# Patient Record
Sex: MALE | Race: WHITE | Employment: OTHER | URBAN - METROPOLITAN AREA
[De-identification: names, ages, dates, MRNs, and addresses within clinical notes are randomized per-mention and may not be internally consistent; named-entity substitution may affect disease eponyms.]

---

## 2021-08-26 ENCOUNTER — TELEPHONE (OUTPATIENT)
Facility: CLINIC | Age: 71
End: 2021-08-26

## 2021-08-26 ENCOUNTER — HOSPITAL ENCOUNTER (OUTPATIENT)
Facility: CLINIC | Age: 71
Setting detail: OBSERVATION
Discharge: HOME | End: 2021-08-29
Attending: HOSPITALIST | Admitting: HOSPITALIST
Payer: MEDICARE

## 2021-08-26 DIAGNOSIS — L03.115 CELLULITIS OF RIGHT LEG: Primary | ICD-10-CM

## 2021-08-26 PROBLEM — L03.90 CELLULITIS: Status: ACTIVE | Noted: 2021-08-26

## 2021-08-26 LAB
GLUCOSE BLDC GLUCOMTR-MCNC: 142 MG/DL (ref 70–99)
HBA1C MFR BLD: 6.7 % (ref 0–5.6)

## 2021-08-26 PROCEDURE — 250N000012 HC RX MED GY IP 250 OP 636 PS 637: Performed by: HOSPITALIST

## 2021-08-26 PROCEDURE — 250N000013 HC RX MED GY IP 250 OP 250 PS 637: Performed by: HOSPITALIST

## 2021-08-26 PROCEDURE — 99220 PR INITIAL OBSERVATION CARE,LEVEL III: CPT | Performed by: HOSPITALIST

## 2021-08-26 PROCEDURE — 96374 THER/PROPH/DIAG INJ IV PUSH: CPT

## 2021-08-26 PROCEDURE — G0378 HOSPITAL OBSERVATION PER HR: HCPCS

## 2021-08-26 PROCEDURE — 36415 COLL VENOUS BLD VENIPUNCTURE: CPT | Performed by: HOSPITALIST

## 2021-08-26 PROCEDURE — 83036 HEMOGLOBIN GLYCOSYLATED A1C: CPT | Performed by: HOSPITALIST

## 2021-08-26 PROCEDURE — 96372 THER/PROPH/DIAG INJ SC/IM: CPT | Mod: XU | Performed by: HOSPITALIST

## 2021-08-26 PROCEDURE — 250N000011 HC RX IP 250 OP 636: Performed by: HOSPITALIST

## 2021-08-26 RX ORDER — HYDRALAZINE HYDROCHLORIDE 25 MG/1
25 TABLET, FILM COATED ORAL 3 TIMES DAILY
COMMUNITY

## 2021-08-26 RX ORDER — NICOTINE POLACRILEX 4 MG
15-30 LOZENGE BUCCAL
Status: DISCONTINUED | OUTPATIENT
Start: 2021-08-26 | End: 2021-08-29 | Stop reason: HOSPADM

## 2021-08-26 RX ORDER — LOSARTAN POTASSIUM 100 MG/1
100 TABLET ORAL DAILY
COMMUNITY

## 2021-08-26 RX ORDER — CYCLOBENZAPRINE HCL 10 MG
10 TABLET ORAL 3 TIMES DAILY PRN
COMMUNITY

## 2021-08-26 RX ORDER — ALBUTEROL SULFATE 90 UG/1
2 AEROSOL, METERED RESPIRATORY (INHALATION) EVERY 4 HOURS PRN
Status: DISCONTINUED | OUTPATIENT
Start: 2021-08-26 | End: 2021-08-29 | Stop reason: HOSPADM

## 2021-08-26 RX ORDER — ACETAMINOPHEN 650 MG/1
650 SUPPOSITORY RECTAL EVERY 6 HOURS PRN
Status: DISCONTINUED | OUTPATIENT
Start: 2021-08-26 | End: 2021-08-29 | Stop reason: HOSPADM

## 2021-08-26 RX ORDER — GABAPENTIN 300 MG/1
900 CAPSULE ORAL 3 TIMES DAILY
COMMUNITY

## 2021-08-26 RX ORDER — HYDRALAZINE HYDROCHLORIDE 20 MG/ML
10 INJECTION INTRAMUSCULAR; INTRAVENOUS EVERY 4 HOURS PRN
Status: DISCONTINUED | OUTPATIENT
Start: 2021-08-26 | End: 2021-08-29 | Stop reason: HOSPADM

## 2021-08-26 RX ORDER — INSULIN GLARGINE 100 [IU]/ML
28 INJECTION, SOLUTION SUBCUTANEOUS AT BEDTIME
COMMUNITY

## 2021-08-26 RX ORDER — FUROSEMIDE 40 MG
40 TABLET ORAL DAILY
COMMUNITY

## 2021-08-26 RX ORDER — GABAPENTIN 300 MG/1
900 CAPSULE ORAL 3 TIMES DAILY
Status: DISCONTINUED | OUTPATIENT
Start: 2021-08-26 | End: 2021-08-29 | Stop reason: HOSPADM

## 2021-08-26 RX ORDER — AZELASTINE 1 MG/ML
2 SPRAY, METERED NASAL 2 TIMES DAILY PRN
COMMUNITY

## 2021-08-26 RX ORDER — BUDESONIDE AND FORMOTEROL FUMARATE DIHYDRATE 160; 4.5 UG/1; UG/1
2 AEROSOL RESPIRATORY (INHALATION) 2 TIMES DAILY
COMMUNITY

## 2021-08-26 RX ORDER — FUROSEMIDE 40 MG
40 TABLET ORAL DAILY
Status: DISCONTINUED | OUTPATIENT
Start: 2021-08-27 | End: 2021-08-29 | Stop reason: HOSPADM

## 2021-08-26 RX ORDER — DEXTROSE MONOHYDRATE 25 G/50ML
25-50 INJECTION, SOLUTION INTRAVENOUS
Status: DISCONTINUED | OUTPATIENT
Start: 2021-08-26 | End: 2021-08-29 | Stop reason: HOSPADM

## 2021-08-26 RX ORDER — SPIRONOLACTONE 25 MG/1
25 TABLET ORAL DAILY
COMMUNITY

## 2021-08-26 RX ORDER — HYDRALAZINE HYDROCHLORIDE 25 MG/1
25 TABLET, FILM COATED ORAL 3 TIMES DAILY
Status: DISCONTINUED | OUTPATIENT
Start: 2021-08-26 | End: 2021-08-29 | Stop reason: HOSPADM

## 2021-08-26 RX ORDER — IPRATROPIUM BROMIDE 42 UG/1
2 SPRAY, METERED NASAL 2 TIMES DAILY
COMMUNITY

## 2021-08-26 RX ORDER — FAMOTIDINE 20 MG/1
20 TABLET, FILM COATED ORAL 2 TIMES DAILY
COMMUNITY

## 2021-08-26 RX ORDER — ACETAMINOPHEN 325 MG/1
650 TABLET ORAL EVERY 6 HOURS PRN
Status: DISCONTINUED | OUTPATIENT
Start: 2021-08-26 | End: 2021-08-29 | Stop reason: HOSPADM

## 2021-08-26 RX ORDER — INSULIN GLARGINE 100 [IU]/ML
26 INJECTION, SOLUTION SUBCUTANEOUS EVERY MORNING
COMMUNITY

## 2021-08-26 RX ORDER — AMLODIPINE BESYLATE 5 MG/1
5 TABLET ORAL DAILY
Status: DISCONTINUED | OUTPATIENT
Start: 2021-08-27 | End: 2021-08-29 | Stop reason: HOSPADM

## 2021-08-26 RX ORDER — METOPROLOL SUCCINATE 50 MG/1
50 TABLET, EXTENDED RELEASE ORAL DAILY
COMMUNITY

## 2021-08-26 RX ORDER — INSULIN GLARGINE 100 [IU]/ML
26 INJECTION, SOLUTION SUBCUTANEOUS 2 TIMES DAILY
Status: DISCONTINUED | OUTPATIENT
Start: 2021-08-26 | End: 2021-08-26

## 2021-08-26 RX ORDER — ONDANSETRON 4 MG/1
4 TABLET, ORALLY DISINTEGRATING ORAL EVERY 6 HOURS PRN
Status: DISCONTINUED | OUTPATIENT
Start: 2021-08-26 | End: 2021-08-29 | Stop reason: HOSPADM

## 2021-08-26 RX ORDER — METFORMIN HCL 500 MG
1000 TABLET, EXTENDED RELEASE 24 HR ORAL EVERY MORNING
COMMUNITY

## 2021-08-26 RX ORDER — LOSARTAN POTASSIUM 100 MG/1
100 TABLET ORAL DAILY
Status: DISCONTINUED | OUTPATIENT
Start: 2021-08-27 | End: 2021-08-29 | Stop reason: HOSPADM

## 2021-08-26 RX ORDER — ATENOLOL 50 MG/1
50 TABLET ORAL DAILY
Status: ON HOLD | COMMUNITY
End: 2021-08-28

## 2021-08-26 RX ORDER — ONDANSETRON 2 MG/ML
4 INJECTION INTRAMUSCULAR; INTRAVENOUS EVERY 6 HOURS PRN
Status: DISCONTINUED | OUTPATIENT
Start: 2021-08-26 | End: 2021-08-29 | Stop reason: HOSPADM

## 2021-08-26 RX ORDER — ALBUTEROL SULFATE 90 UG/1
2 AEROSOL, METERED RESPIRATORY (INHALATION) EVERY 4 HOURS PRN
COMMUNITY

## 2021-08-26 RX ORDER — AMLODIPINE BESYLATE 5 MG/1
5 TABLET ORAL DAILY
COMMUNITY

## 2021-08-26 RX ORDER — METOPROLOL SUCCINATE 50 MG/1
50 TABLET, EXTENDED RELEASE ORAL DAILY
Status: DISCONTINUED | OUTPATIENT
Start: 2021-08-27 | End: 2021-08-29 | Stop reason: HOSPADM

## 2021-08-26 RX ORDER — FLUTICASONE PROPIONATE 50 MCG
1 SPRAY, SUSPENSION (ML) NASAL DAILY
COMMUNITY

## 2021-08-26 RX ORDER — FAMOTIDINE 20 MG/1
20 TABLET, FILM COATED ORAL 2 TIMES DAILY
Status: DISCONTINUED | OUTPATIENT
Start: 2021-08-27 | End: 2021-08-29 | Stop reason: HOSPADM

## 2021-08-26 RX ADMIN — TAZOBACTAM SODIUM AND PIPERACILLIN SODIUM 3.38 G: 375; 3 INJECTION, SOLUTION INTRAVENOUS at 22:36

## 2021-08-26 RX ADMIN — GABAPENTIN 900 MG: 300 CAPSULE ORAL at 22:08

## 2021-08-26 RX ADMIN — INSULIN GLARGINE 20 UNITS: 100 INJECTION, SOLUTION SUBCUTANEOUS at 22:38

## 2021-08-26 RX ADMIN — FLUTICASONE FUROATE AND VILANTEROL TRIFENATATE 1 PUFF: 200; 25 POWDER RESPIRATORY (INHALATION) at 22:35

## 2021-08-26 RX ADMIN — HYDRALAZINE HYDROCHLORIDE 25 MG: 25 TABLET, FILM COATED ORAL at 22:08

## 2021-08-26 ASSESSMENT — ACTIVITIES OF DAILY LIVING (ADL)
WALKING_OR_CLIMBING_STAIRS_DIFFICULTY: NO
DIFFICULTY_EATING/SWALLOWING: NO
TOILETING_ISSUES: NO
EQUIPMENT_CURRENTLY_USED_AT_HOME: CANE, STRAIGHT
FALL_HISTORY_WITHIN_LAST_SIX_MONTHS: YES
HEARING_DIFFICULTY_OR_DEAF: NO
PATIENT_/_FAMILY_COMMUNICATION_STYLE: SPOKEN LANGUAGE (ENGLISH OR BILINGUAL)
DIFFICULTY_COMMUNICATING: NO
WEAR_GLASSES_OR_BLIND: YES
DOING_ERRANDS_INDEPENDENTLY_DIFFICULTY: NO
CONCENTRATING,_REMEMBERING_OR_MAKING_DECISIONS_DIFFICULTY: NO
DRESSING/BATHING_DIFFICULTY: NO

## 2021-08-26 ASSESSMENT — MIFFLIN-ST. JEOR: SCORE: 2180.14

## 2021-08-26 NOTE — TELEPHONE ENCOUNTER
3-Hospitalist Huddle Documentation    Acuity/Preferred Bed Type: medical floor  Infection Concerns: none  Additional Specialist Needed Or Specialist Already Contacted:   None  Timely Treatments Needed: No  Important things to know/address during hospitalization: sitter not needed     I received a call from Josee Santos N.P. at the [facility] [place] [facility] requesting a direct admission for LLE Cellulitis.    Patient has a history of HTN, DM Type 2, CKD and prior LLE cellulitis for which he was hospitalized from 7/2/21-7/11/21.  His symptoms resolved with antibiotics therapy.      He presented to the [facility] on 8/24 with LLE Cellulitis and discharged on Keflex.  He returned to the UR today with ongoing erythema without improvement on oral antibiotics.  Afebrile with normal wbc.  Patient received a dose of IV Zosyn and accepted for direct admission.    Germaine Hall MS, PA-C  Hospitalist Service  Pager [phone]

## 2021-08-27 LAB
ANION GAP SERPL CALCULATED.3IONS-SCNC: 6 MMOL/L (ref 3–14)
BUN SERPL-MCNC: 39 MG/DL (ref 7–30)
CALCIUM SERPL-MCNC: 8.8 MG/DL (ref 8.5–10.1)
CHLORIDE BLD-SCNC: 107 MMOL/L (ref 94–109)
CO2 SERPL-SCNC: 26 MMOL/L (ref 20–32)
CREAT SERPL-MCNC: 1.98 MG/DL (ref 0.66–1.25)
ERYTHROCYTE [DISTWIDTH] IN BLOOD BY AUTOMATED COUNT: 12.9 % (ref 10–15)
GFR SERPL CREATININE-BSD FRML MDRD: 34 ML/MIN/1.73M2
GLUCOSE BLD-MCNC: 114 MG/DL (ref 70–99)
GLUCOSE BLDC GLUCOMTR-MCNC: 124 MG/DL (ref 70–99)
GLUCOSE BLDC GLUCOMTR-MCNC: 148 MG/DL (ref 70–99)
GLUCOSE BLDC GLUCOMTR-MCNC: 152 MG/DL (ref 70–99)
GLUCOSE BLDC GLUCOMTR-MCNC: 167 MG/DL (ref 70–99)
HCT VFR BLD AUTO: 30 % (ref 40–53)
HGB BLD-MCNC: 9.8 G/DL (ref 13.3–17.7)
MCH RBC QN AUTO: 33.8 PG (ref 26.5–33)
MCHC RBC AUTO-ENTMCNC: 32.7 G/DL (ref 31.5–36.5)
MCV RBC AUTO: 103 FL (ref 78–100)
PLATELET # BLD AUTO: 185 10E3/UL (ref 150–450)
POTASSIUM BLD-SCNC: 4.1 MMOL/L (ref 3.4–5.3)
RBC # BLD AUTO: 2.9 10E6/UL (ref 4.4–5.9)
SODIUM SERPL-SCNC: 139 MMOL/L (ref 133–144)
WBC # BLD AUTO: 7.2 10E3/UL (ref 4–11)

## 2021-08-27 PROCEDURE — 99225 PR SUBSEQUENT OBSERVATION CARE,LEVEL II: CPT | Performed by: INTERNAL MEDICINE

## 2021-08-27 PROCEDURE — 99207 PR CDG-CODE CATEGORY CHANGED: CPT | Performed by: INTERNAL MEDICINE

## 2021-08-27 PROCEDURE — 96372 THER/PROPH/DIAG INJ SC/IM: CPT | Performed by: HOSPITALIST

## 2021-08-27 PROCEDURE — 250N000011 HC RX IP 250 OP 636: Performed by: INTERNAL MEDICINE

## 2021-08-27 PROCEDURE — 85027 COMPLETE CBC AUTOMATED: CPT | Performed by: HOSPITALIST

## 2021-08-27 PROCEDURE — 80048 BASIC METABOLIC PNL TOTAL CA: CPT | Performed by: HOSPITALIST

## 2021-08-27 PROCEDURE — 36415 COLL VENOUS BLD VENIPUNCTURE: CPT | Performed by: HOSPITALIST

## 2021-08-27 PROCEDURE — 250N000011 HC RX IP 250 OP 636: Performed by: HOSPITALIST

## 2021-08-27 PROCEDURE — 96372 THER/PROPH/DIAG INJ SC/IM: CPT | Performed by: INTERNAL MEDICINE

## 2021-08-27 PROCEDURE — 96376 TX/PRO/DX INJ SAME DRUG ADON: CPT

## 2021-08-27 PROCEDURE — G0378 HOSPITAL OBSERVATION PER HR: HCPCS

## 2021-08-27 PROCEDURE — 96372 THER/PROPH/DIAG INJ SC/IM: CPT

## 2021-08-27 PROCEDURE — 250N000012 HC RX MED GY IP 250 OP 636 PS 637: Performed by: HOSPITALIST

## 2021-08-27 PROCEDURE — 250N000013 HC RX MED GY IP 250 OP 250 PS 637: Performed by: HOSPITALIST

## 2021-08-27 RX ADMIN — INSULIN ASPART 1 UNITS: 100 INJECTION, SOLUTION INTRAVENOUS; SUBCUTANEOUS at 14:15

## 2021-08-27 RX ADMIN — GABAPENTIN 900 MG: 300 CAPSULE ORAL at 09:07

## 2021-08-27 RX ADMIN — FAMOTIDINE 20 MG: 20 TABLET ORAL at 22:03

## 2021-08-27 RX ADMIN — ENOXAPARIN SODIUM 40 MG: 40 INJECTION SUBCUTANEOUS at 16:02

## 2021-08-27 RX ADMIN — TAZOBACTAM SODIUM AND PIPERACILLIN SODIUM 3.38 G: 375; 3 INJECTION, SOLUTION INTRAVENOUS at 04:31

## 2021-08-27 RX ADMIN — GABAPENTIN 900 MG: 300 CAPSULE ORAL at 22:03

## 2021-08-27 RX ADMIN — INSULIN ASPART 1 UNITS: 100 INJECTION, SOLUTION INTRAVENOUS; SUBCUTANEOUS at 18:25

## 2021-08-27 RX ADMIN — INSULIN GLARGINE 20 UNITS: 100 INJECTION, SOLUTION SUBCUTANEOUS at 22:08

## 2021-08-27 RX ADMIN — FUROSEMIDE 40 MG: 40 TABLET ORAL at 09:07

## 2021-08-27 RX ADMIN — INSULIN GLARGINE 20 UNITS: 100 INJECTION, SOLUTION SUBCUTANEOUS at 09:12

## 2021-08-27 RX ADMIN — METOPROLOL SUCCINATE 50 MG: 50 TABLET, EXTENDED RELEASE ORAL at 09:07

## 2021-08-27 RX ADMIN — HYDRALAZINE HYDROCHLORIDE 25 MG: 25 TABLET, FILM COATED ORAL at 09:07

## 2021-08-27 RX ADMIN — GABAPENTIN 900 MG: 300 CAPSULE ORAL at 16:02

## 2021-08-27 RX ADMIN — HYDRALAZINE HYDROCHLORIDE 25 MG: 25 TABLET, FILM COATED ORAL at 16:01

## 2021-08-27 RX ADMIN — HYDRALAZINE HYDROCHLORIDE 25 MG: 25 TABLET, FILM COATED ORAL at 22:02

## 2021-08-27 RX ADMIN — TAZOBACTAM SODIUM AND PIPERACILLIN SODIUM 3.38 G: 375; 3 INJECTION, SOLUTION INTRAVENOUS at 17:11

## 2021-08-27 RX ADMIN — LOSARTAN POTASSIUM 100 MG: 100 TABLET, FILM COATED ORAL at 09:07

## 2021-08-27 RX ADMIN — AMLODIPINE BESYLATE 5 MG: 5 TABLET ORAL at 09:07

## 2021-08-27 RX ADMIN — TAZOBACTAM SODIUM AND PIPERACILLIN SODIUM 3.38 G: 375; 3 INJECTION, SOLUTION INTRAVENOUS at 10:44

## 2021-08-27 RX ADMIN — FAMOTIDINE 20 MG: 20 TABLET ORAL at 09:07

## 2021-08-27 RX ADMIN — TAZOBACTAM SODIUM AND PIPERACILLIN SODIUM 3.38 G: 375; 3 INJECTION, SOLUTION INTRAVENOUS at 22:55

## 2021-08-27 ASSESSMENT — ACTIVITIES OF DAILY LIVING (ADL): DEPENDENT_IADLS:: INDEPENDENT

## 2021-08-27 NOTE — PHARMACY-ADMISSION MEDICATION HISTORY
Admission medication history interview status for this patient is complete. See [facility] admission navigator for allergy information, prior to admission medications and immunization status.     Medication history interview done, indicate source(s): Patient  Medication history resources (including written lists, pill bottles, clinic record):outside meds      Changes made to PTA medication list:  Added: all  Changed: none  Reported as Not Taking: none  Removed: none    Actions taken by pharmacist (provider contacted, etc):None     Additional medication history information:None    Medication reconciliation/reorder completed by provider prior to medication history?  n   (Y/N)          Prior to Admission medications    Medication Sig Last Dose Taking? Auth Provider   albuterol (PROAIR HFA/PROVENTIL HFA/VENTOLIN HFA) 108 (90 Base) MCG/ACT inhaler Inhale 2 puffs into the lungs every 4 hours as needed for shortness of breath / dyspnea or wheezing  Yes Unknown, Entered By History   amLODIPine (NORVASC) 5 MG tablet Take 5 mg by mouth daily 8/26/2021 at Unknown time Yes Unknown, Entered By History   atenolol (TENORMIN) 50 MG tablet Take 50 mg by mouth daily 8/26/2021 at Unknown time Yes Unknown, Entered By History   azelastine (ASTELIN) 0.1 % nasal spray Spray 2 sprays into both nostrils 2 times daily as needed for rhinitis  Yes Unknown, Entered By History   budesonide-formoterol (SYMBICORT) 160-4.5 MCG/ACT Inhaler Inhale 2 puffs into the lungs 2 times daily 8/26/2021 at am Yes Unknown, Entered By History   cyclobenzaprine (FLEXERIL) 10 MG tablet Take 10 mg by mouth 3 times daily as needed for muscle spasms 8/26/2021 at 1800 Yes Unknown, Entered By History   famotidine (PEPCID) 20 MG tablet Take 20 mg by mouth 2 times daily 8/26/2021 at x2 Yes Unknown, Entered By History   fluticasone (FLONASE) 50 MCG/ACT nasal spray Spray 1 spray into both nostrils daily 8/26/2021 at am Yes Unknown, Entered By History   furosemide (LASIX) 40 MG  tablet Take 40 mg by mouth daily 8/26/2021 at am Yes Unknown, Entered By History   gabapentin (NEURONTIN) 300 MG capsule Take 900 mg by mouth 3 times daily 8/26/2021 at x2 Yes Unknown, Entered By History   hydrALAZINE (APRESOLINE) 25 MG tablet Take 25 mg by mouth 3 times daily 8/26/2021 at x2 Yes Unknown, Entered By History   insulin glargine (BASAGLAR KWIKPEN) 100 UNIT/ML pen Inject 26 Units Subcutaneous every morning 8/26/2021 at Unknown time Yes Unknown, Entered By History   insulin glargine (BASAGLAR KWIKPEN) 100 UNIT/ML pen Inject 28 Units Subcutaneous At Bedtime 8/25/2021 at Unknown time Yes Unknown, Entered By History   ipratropium (ATROVENT) 0.06 % nasal spray Spray 2 sprays into both nostrils 2 times daily 8/26/2021 at am Yes Unknown, Entered By History   ketotifen (ZADITOR) 0.025 % ophthalmic solution 1 drop 2 times daily as needed for itching  Yes Unknown, Entered By History   losartan (COZAAR) 100 MG tablet Take 100 mg by mouth daily 8/26/2021 at Unknown time Yes Unknown, Entered By History   metFORMIN (GLUCOPHAGE-XR) 500 MG 24 hr tablet Take 1,000 mg by mouth every morning 8/26/2021 at Unknown time Yes Unknown, Entered By History   metoprolol succinate ER (TOPROL-XL) 50 MG 24 hr tablet Take 50 mg by mouth daily 8/26/2021 at Unknown time Yes Unknown, Entered By History   sitagliptin (JANUVIA) 100 MG tablet Take 100 mg by mouth daily 8/26/2021 at Unknown time Yes Unknown, Entered By History   spironolactone (ALDACTONE) 25 MG tablet Take 25 mg by mouth daily 8/26/2021 at Unknown time Yes Unknown, Entered By History

## 2021-08-27 NOTE — PLAN OF CARE
To Do:  End of Shift Summary  For vital signs and complete assessments, please see documentation flowsheets.     Pertinent assessments: VSS, A/Ox4. Up adlib. BLE edema. LLE pink/say and scabbed with one open blister. Improving.   Major Shift Events: none  Treatment Plan: Continue IV abx for at least one more day.     PRIMARY DIAGNOSIS: SOFT TISSUE INFECTIONS  OUTPATIENT/OBSERVATION GOALS TO BE MET BEFORE DISCHARGE:  1. Vitals sign stable or return to baseline: Yes    2. Tolerating oral antibiotics or has home infusion set up if applicable: No     3. Pain status: Pain free.    4. Return to near baseline physical activity: Yes    Discharge Planner Nurse   Safe discharge environment identified: Yes  Barriers to discharge: Yes       Entered by: Esther Casanova 08/27/2021 3:00 PM     Please review provider order for any additional goals.     Nurse to notify provider when observation goals have been met and patient is ready for discharge.

## 2021-08-27 NOTE — H&P
[facility]    History and Physical  Hospitalist       Date of Admission:  8/26/2021    Assessment & Plan    Wu Delvalle is a 70 year old male with a past medical history of hypertension, diabetes mellitus on insulin, obesity, CKD 3, HUBER on CPAP who presents with cellulitis of LLE.    #Cellulitis of LLE, recurrent: Pt noted symptoms started on Thursday, 08/19.  Noticed redness, swelling, pain with touching.  He denies fever/chills, generalized weakness.  Went to Urgent care on Tuesday 08/24 and prescribed keflex therapy.  He was told to return in order to have leg re-evaluated.  When leg was evaluated it was still very red and inflammed though patient notes it had improved since starting antibiotics (had previously been all way up into his thigh).  He was sent as direct admission from urgency room.  Pt notes leg looks better now then when he was in urgency room.  Denies any pain.   -Pt afebrile, HDS.  No leukocytosis.  Lactic acid WNL.    -Will give zosyn therapy.  Keep LLE elevated. Suspect patient should be able to discharge in next 24 hours given improvement.   -Can follow blood cultures drawn at [facility].    Chronic Medical Conditions  #COPD: Continue home inhalers  #DM2: Continue home basiglar at reduced dose while her in hospital. Hold metformin and januvia for now.  ISS. Hypoglycemia protocol.   #CKD3: Baseline Cr 1.9-2.1.  At baseline. Monitor  #HUBER: Continue CPAP  #HTN: Continue home antihypertensives once verified by pharm.  Hydralazine prn.     DVT Prophylaxis: Pneumatic Compression Devices  Code Status: Full Code  Dispo: [place] to observation.     Moshe Newton MD    Primary Care Physician   \Carlo Sorto    Chief Complaint   Cellulitis    History is obtained from the patient and patient's chart    History of Present Illness   Wu Delvalle is a 70 year old male with a past medical history of hypertension, diabetes mellitus on insulin, obesity, CKD 3, HUBER on CPAP who presents  with cellulitis of LLE.    Patient had cellulitis of the left lower extremity in early July.  He was hospitalized and received IV antibiotics with improvement.  He states that 1 week prior to admission he developed redness, warmth and swelling of the left lower extremity extending up above the knee to the ankle.  There was weeping as well.  He went to urgent care on Tuesday 8/24 and was prescribed a course of Keflex.  He was told to return in 2 days for evaluation.  When he went to urgent care he had his leg unwrapped and it was warm to touch, erythematous.  The redness had receded down below the knee but there was concern of needing IV antibiotics so was referred to [facility] for this reason.    At urgent care, patient afebrile and hemodynamically stable.  CBC without any leukocytosis.  BMP showed creatinine at baseline at 1.9.  Lactic acid within normal limits.  Patient was given IV Zosyn and transferred to [facility].    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 *    Past Surgical History   2005- Scrotal surgery    Prior to Admission Medications   None     Medication Sig Dispensed Refills Start Date End Date   albuterol HFA (PRO-AIR,VENTOLIN,PROVENTIL) 90 mcg/actuation inhaler   Inhale 1-2 Puffs by mouth every 4 hours if needed.   0       amLODIPine (NORVASC) 5 mg tablet   Take 5 mg by mouth once daily.   0       amlodipine besylate (AMLODIPINE ORAL)   Take by mouth.   0       ammonium lactate 12% topical (LACHYDRIN) 12 %  lotion    Indications: dry skin Apply topically to affected area(s). Apply topically as needed for dry skin. Indications: DRY SKIN   0       Aspirin, Buffered 81 mg tab   Take 81 mg by mouth once daily.   0       atenolol (TENORMIN) 50 mg tablet   Take 50 mg by mouth once daily.   0       azelastine 137 mcg/actuation (ASTELIN) nasal spray   Inhale 2 Sprays in the nostril(s) 2 times daily. As needed   0       azelastine HCl (AZELASTINE NASL)   Inhale into affected nostril(s).   0       BUDESONIDE ORAL   Take by mouth.   0       budesonide-formoterol (SYMBICORT) 160-4.5 mcg/actuation (160-4.5 mcg each actuation) inhaler   Inhale 2 Puffs by mouth 2 times daily.   0       cephalexin (KEFLEX) 500 mg capsule    Indications: Cellulitis, unspecified cellulitis site Take 1 Capsule (500 mg) by mouth 4 times daily for 10 days. 40 Capsule   0 08/24/2021 09/03/2021   CHROM ROBERT/BRINDAL BERRY (GARCINIA CAMBO[place]IA ORAL)   Take 1 tablet by mouth once daily.   0       cyclobenzaprine HCl (CYCLOBENZAPRINE ORAL)   Take by mouth.   0       diclofenac 1 % topical (VOLTAREN) gel   Apply topically to affected area(s) 4 times daily.   0       fluocinonide 0.05% topical (LIDEX) 0.05 % cream   Apply topically to affected area(s) 2 times daily. As needed   0       fluticasone (50 mcg per actuation) nasal solution (FLONASE)   Inhale 2 Sprays into both nostrils once daily.   0       fluticasone propionate (FLONASE NASL)   Inhale into affected nostril(s).   0       gabapentin (NEURONTIN) 300 mg capsule   Take 900 mg by mouth 3 times daily.   0       GABAPENTIN ORAL   Take by mouth.   0       GLUC YAÑEZ/CHONDRO YAÑEZ A/VIT C/MN (GLUCOSAMINE-CHONDROITIN MAX ST ORAL)   Take 1 tablet by mouth once daily.   0       hydrALAZINE (APRESOLINE) 25 mg tablet   Take 25 mg by mouth 3 times daily.   0       hydralazine HCl (HYDRALAZINE ORAL)   Take by mouth.   0       insulin glargine (LANTUS SOLOSTAR PEN) 100 unit/mL (3 mL) solution for injection   Inject 40 Units  subcutaneous 2 times daily at 8 AM and 8 PM.   0       insulin glargine, U-100, (BASAGLAR KWIKPEN) 100 unit/mL (3 mL) pen   Inject subcutaneous before bedtime. Brand: Basaglar Kwikpen   0       ipratropium (ATROVENT NASAL) 0.06 % nasal spray   Inhale 2 Sprays in the nostril(s). Twice daily as needed   0       ipratropium (ATROVENT) 0.02 % nebulizer solution   Inhale 0.5 mg via a nebulizer 4 times daily.   0       ketotifen (ZADITOR) 0.025 % (0.035 %) ophthalmic solution   Place 1 Drop into both eyes 2 times daily. As needed   0       losartan potassium (LOSARTAN ORAL)   Take by mouth.   0       losartan-hydrochlorothiazide (HYZAAR) 100-25 mg tablet   Take 1 tablet by mouth once daily.   0       losartan-hydrochlorothiazide, 50-12.5 mg, (HYZAAR) 50-12.5 mg tablet   Take 1 Tablet by mouth once daily.   0       metformin HCl (METFORMIN ORAL)   Take by mouth.   0       METOPROLOL SUCCINATE ORAL   Take by mouth.   0       MULTIVITAMIN ORAL   Take 1 tablet by mouth once daily.   0       NAPHAZOLINE HCL (CLEAR EYES OPHT)   Place into the eye(s) once daily.   0       nystatin (MYCOSTATIN) cream   Apply topically to affected area(s) 2 times daily.   0       nystatin (MYCOSTATIN) cream   Apply topically to affected area(s) 2 times daily. As needed   0       ranitidine (ZANTAC) 150 mg tablet   Take 150 mg by mouth 2 times daily if needed.   0       sitaGLIPtin (JANUVIA) 100 mg tablet   Take 100 mg by mouth once daily.   0       sitagliptin phosphate (JANUVIA ORAL)   Take by mouth.   0           Allergies   Latex and statins.  No antibiotic allergies.     Social History   Non smoker. Quit in 1994. Rare drinker.  Lives in [facility].     Family History   +FH of diabetes and HTN    Review of Systems   The 10 point Review of Systems is negative other than noted in the HPI or here.     Physical Exam   Temp: 97.9  F (36.6  C) Temp src: Oral BP: (!) 169/71 Pulse: 72   Resp: 16 SpO2: 96 % O2 Device: None (Room air)    Vital Signs with  Ranges  Temp:  [97.9  F (36.6  C)] 97.9  F (36.6  C)  Pulse:  [72] 72  Resp:  [16] 16  BP: (169)/(71) 169/71  SpO2:  [96 %] 96 %  306 lbs 0 oz    Constitutional: Obese. NAD, Nontoxic  HEENT: Normocephalic, MMM, no elevation of JVD noted  Respiratory: Nl WOB, Clear bilaterally, No wheezes, no crackles  Cardiovascular: Regular, no murmur  GI: BS+, NT, ND, no rebound or guarding  Lymph/Hematologic: No bruising. No cervical LAD  Skin: Patient with mild erythema and minimal warmth of the left lower extremity on the anterior shin just above the ankle to well below the knee.  Outlined.  Some swelling also noted.  No tenderness.  Musculoskeletal: Nl Tone, No edema noted  Neurologic: A&Ox3, Answers appropriately. CNII-XII intact. Moves all extremities. No tremor  Psychiatric: Calm    Data   Data reviewed today:  I personally reviewed   WBC 7.8  Hgb 10.9  Plat 209    Lactic acid 1.2    BUN/Cr 39/1.90  K+ 4.5  CO2 24    Clinically Significant Risk Factors Present on Admission

## 2021-08-27 NOTE — PROGRESS NOTES
"[facility]  Hospitalist Progress Note  Triston Lau MD 08/27/2021    Reason for Stay (Diagnosis): Left lower extremity cellulitis         Assessment and Plan:      Summary of Stay: Wu Delvalle is a 70 year old male with history of hypertension, diabetes mellitus on insulin, obesity, CKD 3, HUBER on CPAP who presents with cellulitis of LLE and admitted on 8/26/2021    Problem List:   1. Left lower extremity cellulitis, recurrent.  -He was treated as an outpatient after his symptoms started on 08/19/2021.  -He was on Keflex prior to admission now changed to Zosyn as there was no significant improvement.  -Elevate the leg.  -Continue Zosyn  -Follow-up culture results from urgent care center.  -Pain is fairly controlled  -Lactate is normal, no leukocytosis.  -This seems cellulitis is partially treated with Keflex.  -Reevaluate in the morning, if continues to improve, will discharge on oral antibiotics  2. DM II: Continue sliding scale insulin and Lantus 10 units subcu daily.  -Glucose is 114 this morning, now 148 fairly controlled.  -A1c 6.7  3. Obstructive sleep apnea continue CPAP  4. COPD: Stable  5. Hypertension.  Continue amlodipine, hydralazine, Cozaar, metoprolol    DVT Prophylaxis: Enoxaparin (Lovenox) SQ  Code Status: Full Code  Discharge Dispo: Home  Estimated Disch Date / # of Days until Disch: 1 to 2 days if continues to improve        Interval History (Subjective):      Patient seen and examined, assumed care today, lower extremity cellulitis improving, no fever or chills.  Denied any significant pain.                  Physical Exam:      Last Vital Signs:  BP (!) 159/64 (BP Location: Right arm)   Pulse 64   Temp 98.9  F (37.2  C) (Oral)   Resp 14   Ht 1.803 m (5' 11\")   Wt 138.8 kg (306 lb)   SpO2 96%   BMI 42.68 kg/m      No intake/output data recorded.  Vitals:    08/26/21 2011   Weight: 138.8 kg (306 lb)     Current Facility-Administered Medications   Medication     " acetaminophen (TYLENOL) tablet 650 mg    Or     acetaminophen (TYLENOL) Suppository 650 mg     albuterol (PROAIR HFA/PROVENTIL HFA/VENTOLIN HFA) 108 (90 Base) MCG/ACT inhaler 2 puff     amLODIPine (NORVASC) tablet 5 mg     glucose gel 15-30 g    Or     dextrose 50 % injection 25-50 mL    Or     glucagon injection 1 mg     famotidine (PEPCID) tablet 20 mg     fluticasone-vilanterol (BREO ELLIPTA) 200-25 MCG/INH inhaler 1 puff     furosemide (LASIX) tablet 40 mg     gabapentin (NEURONTIN) capsule 900 mg     hydrALAZINE (APRESOLINE) injection 10 mg     hydrALAZINE (APRESOLINE) tablet 25 mg     insulin aspart (NovoLOG) injection (RAPID ACTING)     insulin aspart (NovoLOG) injection (RAPID ACTING)     insulin glargine (LANTUS) injection 20 Units     losartan (COZAAR) tablet 100 mg     melatonin tablet 1 mg     metoprolol succinate ER (TOPROL-XL) 24 hr tablet 50 mg     ondansetron (ZOFRAN-ODT) ODT tab 4 mg    Or     ondansetron (ZOFRAN) injection 4 mg     piperacillin-tazobactam (ZOSYN) infusion 3.375 g       Constitutional: Awake, alert, cooperative, no apparent distress   Respiratory: Clear to auscultation bilaterally, no crackles or wheezing   Cardiovascular: Regular rate and rhythm, normal S1 and S2, and no murmur noted   Abdomen: Normal bowel sounds, soft, non-distended, non-tender   Skin: No rashes, no cyanosis, dry to touch   Neuro: Alert and oriented x3, no weakness, numbness, memory loss   Extremities:  Left leg is started, noted ulcerations, warm, seems to be improving, normal range of motion   Other(s):HEENT  Pink, nonicteric, moist oral       All other systems: Negative          Medications:      All current medications were reviewed with changes reflected in problem list.         Data:      All new lab and imaging data was reviewed.   Labs:  No results for input(s): CULT in the last 168 hours.  Recent Labs   Lab 08/27/21  1251 08/27/21  0657 08/27/21  0218   NA  --  139  --    POTASSIUM  --  4.1  --     CHLORIDE  --  107  --    CO2  --  26  --    ANIONGAP  --  6  --    * 114* 124*   BUN  --  39*  --    CR  --  1.98*  --    GFRESTIMATED  --  34*  --    MOI  --  8.8  --      Recent Labs   Lab 08/27/21  0657   WBC 7.2   HGB 9.8*   HCT 30.0*   *        No results for input(s): SED, CRP in the last 168 hours.  Recent Labs   Lab 08/27/21  1251 08/27/21  0657 08/27/21  0218 08/26/21  2222   * 114* 124* 142*      Imaging:   No results found for this or any previous visit.

## 2021-08-27 NOTE — PLAN OF CARE
PRIMARY DIAGNOSIS: SOFT TISSUE INFECTIONS  OUTPATIENT/OBSERVATION GOALS TO BE MET BEFORE DISCHARGE:  1. Vitals sign stable or return to baseline: Yes    2. Tolerating oral antibiotics or has home infusion set up if applicable: No - Remains on IV abx    3. Pain status: Pain free.    4. Return to near baseline physical activity: Yes    Discharge Planner Nurse   Safe discharge environment identified: Yes  Barriers to discharge: Yes       Entered by: Esther Casanova 08/27/2021 2:57 PM     Please review provider order for any additional goals.     Nurse to notify provider when observation goals have been met and patient is ready for discharge.

## 2021-08-27 NOTE — PLAN OF CARE
End of Shift Summary  For vital signs and complete assessments, please see documentation flowsheets.     Pertinent assessments: VSS, afebrile, room air, denies any pain, LLE is reddened--baseline neuropathy, redness outlined per admitting MD, up independently in room, zosyn for antibiotic    Major Shift Events admit  Treatment Plan: IV zosyn, elevate  Bedside Nurse: Marielos Quesada RN

## 2021-08-27 NOTE — PLAN OF CARE
PRIMARY DIAGNOSIS: SOFT TISSUE INFECTIONS  OUTPATIENT/OBSERVATION GOALS TO BE MET BEFORE DISCHARGE:  1. Vitals sign stable or return to baseline: Yes    2. Tolerating oral antibiotics or has home infusion set up if applicable: No - Remains on IV abx    3. Pain status: Pain free.    4. Return to near baseline physical activity: Yes    Discharge Planner Nurse   Safe discharge environment identified: Yes  Barriers to discharge: Yes       Entered by: Esther Casanova 08/27/2021 2:58 PM     Please review provider order for any additional goals.     Nurse to notify provider when observation goals have been met and patient is ready for discharge.    * * * * *    To Do:  End of Shift Summary  For vital signs and complete assessments, please see documentation flowsheets.     Pertinent assessments: VSS, A/Ox4. Up adlib. BLE edema. LLE pink/say and scabbed with one open blister. Improving.   Major Shift Events: none  Treatment Plan: Continue IV abx for at least one more day.

## 2021-08-28 LAB
GLUCOSE BLDC GLUCOMTR-MCNC: 137 MG/DL (ref 70–99)
GLUCOSE BLDC GLUCOMTR-MCNC: 142 MG/DL (ref 70–99)
GLUCOSE BLDC GLUCOMTR-MCNC: 146 MG/DL (ref 70–99)
GLUCOSE BLDC GLUCOMTR-MCNC: 162 MG/DL (ref 70–99)
GLUCOSE BLDC GLUCOMTR-MCNC: 190 MG/DL (ref 70–99)

## 2021-08-28 PROCEDURE — 96372 THER/PROPH/DIAG INJ SC/IM: CPT

## 2021-08-28 PROCEDURE — 250N000011 HC RX IP 250 OP 636: Performed by: INTERNAL MEDICINE

## 2021-08-28 PROCEDURE — 96376 TX/PRO/DX INJ SAME DRUG ADON: CPT

## 2021-08-28 PROCEDURE — 96372 THER/PROPH/DIAG INJ SC/IM: CPT | Performed by: HOSPITALIST

## 2021-08-28 PROCEDURE — 250N000011 HC RX IP 250 OP 636: Performed by: HOSPITALIST

## 2021-08-28 PROCEDURE — G0378 HOSPITAL OBSERVATION PER HR: HCPCS

## 2021-08-28 PROCEDURE — 250N000013 HC RX MED GY IP 250 OP 250 PS 637: Performed by: INTERNAL MEDICINE

## 2021-08-28 PROCEDURE — 99207 PR CDG-CODE CATEGORY CHANGED: CPT | Performed by: INTERNAL MEDICINE

## 2021-08-28 PROCEDURE — 250N000013 HC RX MED GY IP 250 OP 250 PS 637: Performed by: HOSPITALIST

## 2021-08-28 PROCEDURE — 250N000012 HC RX MED GY IP 250 OP 636 PS 637: Performed by: HOSPITALIST

## 2021-08-28 PROCEDURE — 96372 THER/PROPH/DIAG INJ SC/IM: CPT | Performed by: INTERNAL MEDICINE

## 2021-08-28 PROCEDURE — 99225 PR SUBSEQUENT OBSERVATION CARE,LEVEL II: CPT | Performed by: INTERNAL MEDICINE

## 2021-08-28 RX ORDER — SPIRONOLACTONE 25 MG/1
25 TABLET ORAL DAILY
Status: DISCONTINUED | OUTPATIENT
Start: 2021-08-28 | End: 2021-08-29 | Stop reason: HOSPADM

## 2021-08-28 RX ORDER — METFORMIN HCL 500 MG
1000 TABLET, EXTENDED RELEASE 24 HR ORAL EVERY MORNING
Status: DISCONTINUED | OUTPATIENT
Start: 2021-08-29 | End: 2021-08-28

## 2021-08-28 RX ORDER — IPRATROPIUM BROMIDE 42 UG/1
2 SPRAY, METERED NASAL 2 TIMES DAILY
Status: DISCONTINUED | OUTPATIENT
Start: 2021-08-28 | End: 2021-08-29 | Stop reason: HOSPADM

## 2021-08-28 RX ORDER — CYCLOBENZAPRINE HCL 5 MG
10 TABLET ORAL 3 TIMES DAILY PRN
Status: DISCONTINUED | OUTPATIENT
Start: 2021-08-28 | End: 2021-08-29 | Stop reason: HOSPADM

## 2021-08-28 RX ORDER — ATENOLOL 50 MG/1
50 TABLET ORAL DAILY
Status: DISCONTINUED | OUTPATIENT
Start: 2021-08-28 | End: 2021-08-28

## 2021-08-28 RX ORDER — FLUTICASONE PROPIONATE 50 MCG
1 SPRAY, SUSPENSION (ML) NASAL DAILY
Status: DISCONTINUED | OUTPATIENT
Start: 2021-08-28 | End: 2021-08-29 | Stop reason: HOSPADM

## 2021-08-28 RX ADMIN — HYDRALAZINE HYDROCHLORIDE 25 MG: 25 TABLET, FILM COATED ORAL at 15:43

## 2021-08-28 RX ADMIN — GABAPENTIN 900 MG: 300 CAPSULE ORAL at 07:32

## 2021-08-28 RX ADMIN — GABAPENTIN 900 MG: 300 CAPSULE ORAL at 15:43

## 2021-08-28 RX ADMIN — INSULIN GLARGINE 20 UNITS: 100 INJECTION, SOLUTION SUBCUTANEOUS at 21:49

## 2021-08-28 RX ADMIN — TAZOBACTAM SODIUM AND PIPERACILLIN SODIUM 3.38 G: 375; 3 INJECTION, SOLUTION INTRAVENOUS at 17:14

## 2021-08-28 RX ADMIN — HYDRALAZINE HYDROCHLORIDE 25 MG: 25 TABLET, FILM COATED ORAL at 07:32

## 2021-08-28 RX ADMIN — SITAGLIPTIN 100 MG: 50 TABLET, FILM COATED ORAL at 15:43

## 2021-08-28 RX ADMIN — IPRATROPIUM BROMIDE 2 SPRAY: 42 SPRAY, METERED NASAL at 21:40

## 2021-08-28 RX ADMIN — AMLODIPINE BESYLATE 5 MG: 5 TABLET ORAL at 07:32

## 2021-08-28 RX ADMIN — ENOXAPARIN SODIUM 40 MG: 40 INJECTION SUBCUTANEOUS at 15:43

## 2021-08-28 RX ADMIN — INSULIN GLARGINE 20 UNITS: 100 INJECTION, SOLUTION SUBCUTANEOUS at 07:33

## 2021-08-28 RX ADMIN — LOSARTAN POTASSIUM 100 MG: 100 TABLET, FILM COATED ORAL at 07:32

## 2021-08-28 RX ADMIN — FUROSEMIDE 40 MG: 40 TABLET ORAL at 07:32

## 2021-08-28 RX ADMIN — TAZOBACTAM SODIUM AND PIPERACILLIN SODIUM 3.38 G: 375; 3 INJECTION, SOLUTION INTRAVENOUS at 22:50

## 2021-08-28 RX ADMIN — TAZOBACTAM SODIUM AND PIPERACILLIN SODIUM 3.38 G: 375; 3 INJECTION, SOLUTION INTRAVENOUS at 11:19

## 2021-08-28 RX ADMIN — SPIRONOLACTONE 25 MG: 25 TABLET ORAL at 15:43

## 2021-08-28 RX ADMIN — GABAPENTIN 900 MG: 300 CAPSULE ORAL at 21:36

## 2021-08-28 RX ADMIN — INSULIN ASPART 2 UNITS: 100 INJECTION, SOLUTION INTRAVENOUS; SUBCUTANEOUS at 12:42

## 2021-08-28 RX ADMIN — INSULIN ASPART 1 UNITS: 100 INJECTION, SOLUTION INTRAVENOUS; SUBCUTANEOUS at 07:35

## 2021-08-28 RX ADMIN — FAMOTIDINE 20 MG: 20 TABLET ORAL at 21:36

## 2021-08-28 RX ADMIN — HYDRALAZINE HYDROCHLORIDE 25 MG: 25 TABLET, FILM COATED ORAL at 21:36

## 2021-08-28 RX ADMIN — FLUTICASONE FUROATE AND VILANTEROL TRIFENATATE 1 PUFF: 200; 25 POWDER RESPIRATORY (INHALATION) at 21:38

## 2021-08-28 RX ADMIN — TAZOBACTAM SODIUM AND PIPERACILLIN SODIUM 3.38 G: 375; 3 INJECTION, SOLUTION INTRAVENOUS at 04:33

## 2021-08-28 RX ADMIN — METOPROLOL SUCCINATE 50 MG: 50 TABLET, EXTENDED RELEASE ORAL at 07:32

## 2021-08-28 RX ADMIN — FAMOTIDINE 20 MG: 20 TABLET ORAL at 07:32

## 2021-08-28 NOTE — PLAN OF CARE
PRIMARY DIAGNOSIS: SOFT TISSUE INFECTIONS  OUTPATIENT/OBSERVATION GOALS TO BE MET BEFORE DISCHARGE:  Vitals sign stable or return to baseline: Yes    Tolerating oral antibiotics or has home infusion set up if applicable: No - Remains on IV abx    Pain status: Pain free.    Return to near baseline physical activity: Yes    Discharge Planner Nurse   Safe discharge environment identified: Yes  Barriers to discharge: Yes       Entered by: Esther Casanova 08/28/2021 8:31 AM     Please review provider order for any additional goals.     Nurse to notify provider when observation goals have been met and patient is ready for discharge.

## 2021-08-28 NOTE — PROGRESS NOTES
VSS, afebrile, no c/o pain. BG at 0200 of 146. Pt slept majority of shift. Up independently in room. Cellulitis of LLE appears to be resolving, IV Zosyn given per orders.

## 2021-08-28 NOTE — PLAN OF CARE
PRIMARY DIAGNOSIS: SOFT TISSUE INFECTIONS  OUTPATIENT/OBSERVATION GOALS TO BE MET BEFORE DISCHARGE:  Vitals sign stable or return to baseline: Yes    Tolerating oral antibiotics or has home infusion set up if applicable: No - IV abx continued.     Pain status: Pain free.    Return to near baseline physical activity: Yes    Discharge Planner Nurse   Safe discharge environment identified: Yes  Barriers to discharge: Yes       Entered by: Esther Casanova 08/28/2021 12:13 PM     Please review provider order for any additional goals.     Nurse to notify provider when observation goals have been met and patient is ready for discharge.

## 2021-08-28 NOTE — PLAN OF CARE
To Do:  End of Shift Summary  For vital signs and complete assessments, please see documentation flowsheets.  Pertinent assessments: A/Ox4. Up ad skyler. Skin tear to LUE. BLE edema. +1 pulses. Baseline numbness/tingling to lower extremities. L leg redness outlined - blotchy, peeling with scabbed over blisters. Warm to touch.   Major Shift Events: None  Treatment Plan: Continue IV abx at least one more day. Potential discharge tomorrow.        * * * * *   PRIMARY DIAGNOSIS: SOFT TISSUE INFECTIONS  OUTPATIENT/OBSERVATION GOALS TO BE MET BEFORE DISCHARGE:  Vitals sign stable or return to baseline: Yes    Tolerating oral antibiotics or has home infusion set up if applicable: No - requiring IV abx    Pain status: Pain free.    Return to near baseline physical activity: Yes    Discharge Planner Nurse   Safe discharge environment identified: Yes  Barriers to discharge: Yes       Entered by: Esther Casanova 08/28/2021 4:12 PM     Please review provider order for any additional goals.     Nurse to notify provider when observation goals have been met and patient is ready for discharge.

## 2021-08-28 NOTE — PROGRESS NOTES
[facility]  Hospitalist Progress Note  Triston Lau MD 08/28/2021    Reason for Stay (Diagnosis): Left lower extremity cellulitis         Assessment and Plan:      Summary of Stay: Wu Delvalle is a 70 year old male with history of hypertension, diabetes mellitus on insulin, obesity, CKD 3, HUBER on CPAP who presents with cellulitis of LLE and admitted on 8/26/2021    Problem List:   1. Left lower extremity cellulitis, recurrent.  -He was treated as an outpatient after his symptoms started on 08/19/202.  -He was on Keflex prior to admission now changed to Zosyn as there was no significant improvement.  -Elevate the leg when possible,.  -Continue Zosyn for another day and will transition to oral antibiotic tomorrow.  -Follow-up culture results from urgent care center.  -Pain is fairly controlled  -Lactate is normal, no leukocytosis.  -It seems that the cellulitis was partially treated with Keflex without significant improvement.  -Overall showing some improvement, slightly receding but is still warm and red.    2.  DM II:   -Glucose is 114 this morning, now 148 fairly controlled.  -A1c 6.7  -Started on Lantus 20 units twice a day  -Continue sliding scale insulin  -Added premeal NovoLog 2 units per 15 g of carb    3.  Obstructive sleep apnea continue CPAP    4.  COPD: Stable    5.  Hypertension.    -Continue amlodipine, hydralazine, Cozaar, metoprolol  -Blood pressure is fairly controlled  -Restarted spironolactone    DVT Prophylaxis: Enoxaparin (Lovenox) SQ  Code Status: Full Code  Discharge Dispo: Home  Estimated Disch Date / # of Days until Disch: 1 day.   I discussed with patient the plan of care, all his question and concerns addressed.      Interval History (Subjective):      Patient seen and examined, feels better, lower extremity swelling and redness slightly better, no fever or chills.  Denied any significant pain.                  Physical Exam:      Last Vital Signs:  BP (!) 147/60  "(BP Location: Right arm)   Pulse 71   Temp 99.1  F (37.3  C) (Oral)   Resp 16   Ht 1.803 m (5' 11\")   Wt 138.8 kg (306 lb)   SpO2 99%   BMI 42.68 kg/m      I/O last 3 completed shifts:  In: 720 [P.O.:720]  Out: -   Vitals:    08/26/21 2011   Weight: 138.8 kg (306 lb)     Current Facility-Administered Medications   Medication     acetaminophen (TYLENOL) tablet 650 mg    Or     acetaminophen (TYLENOL) Suppository 650 mg     albuterol (PROAIR HFA/PROVENTIL HFA/VENTOLIN HFA) 108 (90 Base) MCG/ACT inhaler 2 puff     amLODIPine (NORVASC) tablet 5 mg     glucose gel 15-30 g    Or     dextrose 50 % injection 25-50 mL    Or     glucagon injection 1 mg     enoxaparin ANTICOAGULANT (LOVENOX) injection 40 mg     famotidine (PEPCID) tablet 20 mg     fluticasone-vilanterol (BREO ELLIPTA) 200-25 MCG/INH inhaler 1 puff     furosemide (LASIX) tablet 40 mg     gabapentin (NEURONTIN) capsule 900 mg     hydrALAZINE (APRESOLINE) injection 10 mg     hydrALAZINE (APRESOLINE) tablet 25 mg     insulin aspart (NovoLOG) injection (RAPID ACTING)     insulin aspart (NovoLOG) injection (RAPID ACTING)     insulin glargine (LANTUS) injection 20 Units     losartan (COZAAR) tablet 100 mg     melatonin tablet 1 mg     metoprolol succinate ER (TOPROL-XL) 24 hr tablet 50 mg     ondansetron (ZOFRAN-ODT) ODT tab 4 mg    Or     ondansetron (ZOFRAN) injection 4 mg     piperacillin-tazobactam (ZOSYN) infusion 3.375 g     Constitutional: Awake, alert, cooperative, no apparent distress   Respiratory: Clear to auscultation bilaterally, no crackles or wheezing   Cardiovascular: Regular rate and rhythm, normal S1 and S2, and no murmur noted   Abdomen: Normal bowel sounds, soft, non-distended, non-tender   Skin: No rashes, no cyanosis, dry to touch   Neuro: Alert and oriented x3, no weakness, numbness, memory loss   Extremities:  Left leg is started, noted ulcerations, warm, seems to be improving, normal range of motion   Other(s):HEENT  Pink, nonicteric, " moist oral       All other systems: Negative          Medications:      All current medications were reviewed with changes reflected in problem list.         Data:      All new lab and imaging data was reviewed.   Labs:  No results for input(s): CULT in the last 168 hours.  Recent Labs   Lab 08/28/21  1240 08/28/21  0729 08/28/21  0230 08/27/21  0657   NA  --   --   --  139   POTASSIUM  --   --   --  4.1   CHLORIDE  --   --   --  107   CO2  --   --   --  26   ANIONGAP  --   --   --  6   * 146* 142* 114*   BUN  --   --   --  39*   CR  --   --   --  1.98*   GFRESTIMATED  --   --   --  34*   MOI  --   --   --  8.8     Recent Labs   Lab 08/27/21  0657   WBC 7.2   HGB 9.8*   HCT 30.0*   *        No results for input(s): SED, CRP in the last 168 hours.  Recent Labs   Lab 08/28/21  1240 08/28/21  0729 08/28/21  0230 08/27/21  2201 08/27/21  1823   * 146* 142* 167* 152*      Imaging:   No results found for this or any previous visit.

## 2021-08-28 NOTE — PLAN OF CARE
End of Shift Summary  For vital signs and complete assessments, please see documentation flowsheets.     Pertinent assessments:  Independent in room. No c/o pain. BLE edema. LLE pink/say and scabbed, appears to be improving. Slept majority of shift.     Major Shift Events: none    Treatment Plan: IV abx     Bedside Nurse: Claire Cleaning RN

## 2021-08-29 VITALS
WEIGHT: 306 LBS | TEMPERATURE: 98.5 F | OXYGEN SATURATION: 98 % | SYSTOLIC BLOOD PRESSURE: 152 MMHG | BODY MASS INDEX: 42.84 KG/M2 | HEART RATE: 84 BPM | HEIGHT: 71 IN | RESPIRATION RATE: 16 BRPM | DIASTOLIC BLOOD PRESSURE: 86 MMHG

## 2021-08-29 LAB
GLUCOSE BLDC GLUCOMTR-MCNC: 133 MG/DL (ref 70–99)
GLUCOSE BLDC GLUCOMTR-MCNC: 143 MG/DL (ref 70–99)

## 2021-08-29 PROCEDURE — 99217 PR OBSERVATION CARE DISCHARGE: CPT | Performed by: INTERNAL MEDICINE

## 2021-08-29 PROCEDURE — 250N000011 HC RX IP 250 OP 636: Performed by: HOSPITALIST

## 2021-08-29 PROCEDURE — 96372 THER/PROPH/DIAG INJ SC/IM: CPT

## 2021-08-29 PROCEDURE — G0378 HOSPITAL OBSERVATION PER HR: HCPCS

## 2021-08-29 PROCEDURE — 250N000013 HC RX MED GY IP 250 OP 250 PS 637: Performed by: INTERNAL MEDICINE

## 2021-08-29 PROCEDURE — 250N000013 HC RX MED GY IP 250 OP 250 PS 637: Performed by: HOSPITALIST

## 2021-08-29 PROCEDURE — 250N000012 HC RX MED GY IP 250 OP 636 PS 637: Performed by: HOSPITALIST

## 2021-08-29 PROCEDURE — 96372 THER/PROPH/DIAG INJ SC/IM: CPT | Performed by: HOSPITALIST

## 2021-08-29 PROCEDURE — 96376 TX/PRO/DX INJ SAME DRUG ADON: CPT

## 2021-08-29 RX ADMIN — TAZOBACTAM SODIUM AND PIPERACILLIN SODIUM 3.38 G: 375; 3 INJECTION, SOLUTION INTRAVENOUS at 04:40

## 2021-08-29 RX ADMIN — METOPROLOL SUCCINATE 50 MG: 50 TABLET, EXTENDED RELEASE ORAL at 08:43

## 2021-08-29 RX ADMIN — HYDRALAZINE HYDROCHLORIDE 25 MG: 25 TABLET, FILM COATED ORAL at 08:43

## 2021-08-29 RX ADMIN — FUROSEMIDE 40 MG: 40 TABLET ORAL at 08:43

## 2021-08-29 RX ADMIN — GABAPENTIN 900 MG: 300 CAPSULE ORAL at 08:43

## 2021-08-29 RX ADMIN — SITAGLIPTIN 100 MG: 50 TABLET, FILM COATED ORAL at 08:43

## 2021-08-29 RX ADMIN — FAMOTIDINE 20 MG: 20 TABLET ORAL at 08:43

## 2021-08-29 RX ADMIN — AMLODIPINE BESYLATE 5 MG: 5 TABLET ORAL at 08:43

## 2021-08-29 RX ADMIN — FLUTICASONE PROPIONATE 1 SPRAY: 50 SPRAY, METERED NASAL at 08:47

## 2021-08-29 RX ADMIN — LOSARTAN POTASSIUM 100 MG: 100 TABLET, FILM COATED ORAL at 08:43

## 2021-08-29 RX ADMIN — SPIRONOLACTONE 25 MG: 25 TABLET ORAL at 08:43

## 2021-08-29 RX ADMIN — INSULIN GLARGINE 20 UNITS: 100 INJECTION, SOLUTION SUBCUTANEOUS at 08:54

## 2021-08-29 RX ADMIN — IPRATROPIUM BROMIDE 2 SPRAY: 42 SPRAY, METERED NASAL at 08:47

## 2021-08-29 RX ADMIN — INSULIN ASPART 1 UNITS: 100 INJECTION, SOLUTION INTRAVENOUS; SUBCUTANEOUS at 08:45

## 2021-08-29 RX ADMIN — TAZOBACTAM SODIUM AND PIPERACILLIN SODIUM 3.38 G: 375; 3 INJECTION, SOLUTION INTRAVENOUS at 11:05

## 2021-08-29 NOTE — PLAN OF CARE
PRIMARY DIAGNOSIS: SOFT TISSUE INFECTIONS  OUTPATIENT/OBSERVATION GOALS TO BE MET BEFORE DISCHARGE:  Vitals sign stable or return to baseline: Yes    Tolerating oral antibiotics or has home infusion set up if applicable: Yes    Pain status: Pain free.    Return to near baseline physical activity: Yes    Discharge Planner Nurse   Safe discharge environment identified: Yes  Barriers to discharge: No       Entered by: Joann Baeza 08/29/2021 6:45 AM   IV Zosyn. , Transition to po antibiotic today and poss discharge.  Please review provider order for any additional goals.     Nurse to notify provider when observation goals have been met and patient is ready for discharge.

## 2021-08-29 NOTE — PROGRESS NOTES
CM: MD and RN stated pt will not need resumption of home care for d.c planning support. SW will leave VM message for [facility]

## 2021-08-29 NOTE — DISCHARGE SUMMARY
[facility]  Discharge Summary  Name: Wu Delvalle    MRN: [id number]  YOB: 1950    Age: 70 year old  Date of Discharge:  8/29/2021 12:31 PM  Date of Admission: 8/26/2021  Primary Care Provider: Carlo Sorto  Discharge Physician:  Triston Lau M.D  Discharging Service:  Hospitalist      Discharge Diagnosis:  1. Left lower extremity cellulitis, recurrent.   2. DM II.  3. HUBER.  4. COPD.  5. HTN.     Other Diagnosis:  None     Discharge Disposition:  Discharged to home     Allergies:  Allergies   Allergen Reactions     Hmg-Coa-R Inhibitors      Other reaction(s): Myalgias     Hydrocodone      PN: Restlessness        Discharge Medications:   Discharge Medication List as of 8/29/2021 10:31 AM      START taking these medications    Details   amoxicillin-clavulanate (AUGMENTIN) 875-125 MG tablet Take 1 tablet by mouth 2 times daily, Disp-14 tablet, R-0, E-Prescribe         CONTINUE these medications which have NOT CHANGED    Details   albuterol (PROAIR HFA/PROVENTIL HFA/VENTOLIN HFA) 108 (90 Base) MCG/ACT inhaler Inhale 2 puffs into the lungs every 4 hours as needed for shortness of breath / dyspnea or wheezing, Historical      amLODIPine (NORVASC) 5 MG tablet Take 5 mg by mouth daily, Historical      azelastine (ASTELIN) 0.1 % nasal spray Spray 2 sprays into both nostrils 2 times daily as needed for rhinitis, Historical      budesonide-formoterol (SYMBICORT) 160-4.5 MCG/ACT Inhaler Inhale 2 puffs into the lungs 2 times daily, Historical      cyclobenzaprine (FLEXERIL) 10 MG tablet Take 10 mg by mouth 3 times daily as needed for muscle spasms, Historical      famotidine (PEPCID) 20 MG tablet Take 20 mg by mouth 2 times daily, Historical      fluticasone (FLONASE) 50 MCG/ACT nasal spray Spray 1 spray into both nostrils daily, Historical      furosemide (LASIX) 40 MG tablet Take 40 mg by mouth daily, Historical      gabapentin (NEURONTIN) 300 MG capsule Take 900 mg by mouth 3 times daily,  "Historical      hydrALAZINE (APRESOLINE) 25 MG tablet Take 25 mg by mouth 3 times daily, Historical      !! insulin glargine (BASAGLAR KWIKPEN) 100 UNIT/ML pen Inject 26 Units Subcutaneous every morning, Historical      !! insulin glargine (BASAGLAR KWIKPEN) 100 UNIT/ML pen Inject 28 Units Subcutaneous At Bedtime, Historical      ipratropium (ATROVENT) 0.06 % nasal spray Spray 2 sprays into both nostrils 2 times daily, Historical      ketotifen (ZADITOR) 0.025 % ophthalmic solution 1 drop 2 times daily as needed for itching, Historical      losartan (COZAAR) 100 MG tablet Take 100 mg by mouth daily, Historical      metFORMIN (GLUCOPHAGE-XR) 500 MG 24 hr tablet Take 1,000 mg by mouth every morning, Historical      metoprolol succinate ER (TOPROL-XL) 50 MG 24 hr tablet Take 50 mg by mouth daily, Historical      sitagliptin (JANUVIA) 100 MG tablet Take 100 mg by mouth daily, Historical      spironolactone (ALDACTONE) 25 MG tablet Take 25 mg by mouth daily, Historical       !! - Potential duplicate medications found. Please discuss with provider.           Condition on Discharge:  Discharge condition: Fair   Discharge vitals: Blood pressure (!) 152/86, pulse 84, temperature 98.5  F (36.9  C), temperature source Oral, resp. rate 16, height 1.803 m (5' 11\"), weight 138.8 kg (306 lb), SpO2 98 %.   Code status on discharge: Full Code     History of Illness:  See detailed admission note for full details.    Significant Physical Exam Findings:  Constitutional: Awake, alert, cooperative, no apparent distress   Respiratory: Clear to auscultation bilaterally, no crackles or wheezing   Cardiovascular: Regular rate and rhythm, normal S1 and S2, and no murmur noted   Abdomen: Normal bowel sounds, soft, non-distended, non-tender   Skin: No rashes, no cyanosis, dry to touch   Neuro: Alert and oriented x3, no weakness, numbness, memory loss   Extremities:  Left leg is started, noted ulcerations, warm, seems to be improving, normal " range of motion   Other(s):HEENT  Pink, nonicteric, moist oral         All other systems: Negative     Procedures other than Imaging:  None     Imaging:  No results found for this or any previous visit.     Consultations:  Consultation during this admission received from .     Recent Lab Results:  Recent Labs   Lab 08/27/21  0657   WBC 7.2   HGB 9.8*   HCT 30.0*   *        Recent Labs   Lab 08/29/21  0802 08/29/21  0144 08/28/21 2135 08/27/21  0657   NA  --   --   --  139   POTASSIUM  --   --   --  4.1   CHLORIDE  --   --   --  107   CO2  --   --   --  26   ANIONGAP  --   --   --  6   * 133* 162* 114*   BUN  --   --   --  39*   CR  --   --   --  1.98*   GFRESTIMATED  --   --   --  34*   MOI  --   --   --  8.8     Recent Labs   Lab 08/29/21  0802 08/29/21  0144 08/28/21 2135 08/28/21  1754 08/28/21  1240   * 133* 162* 137* 190*          Pending Results:    Unresulted Labs Ordered in the Past 30 Days of this Admission     No orders found from 7/27/2021 to 8/27/2021.              Reason for your hospital stay    Cellulitis     Follow-up and recommended labs and tests     Follow up with primary care provider, Carlo Sorto, within 7 days for hospital follow- up.  The following labs/tests are recommended: BMP, CBC result to PCP.     Activity    Your activity upon discharge: activity as tolerated     Elevate    The legs whenever possible     Diet    Follow this diet upon discharge: Orders Placed This Encounter      Consistent Carb 60 grams CHO per Meal Diet         Hospital Course:  Wu Delvalle is a 70 year old male with history of hypertension, diabetes mellitus on insulin, obesity, CKD 3, HUBER on CPAP who presents with cellulitis of LLE and admitted on 8/26/2021    Problem List:   1. Left lower extremity cellulitis, recurrent. He was treated as an outpatient after his symptoms started on 08/19/202. He was on Keflex prior to admission now changed to Zosyn as there was no significant  improvement. Elevate the leg when possible. Continue Zosyn for another day and will transition to oral antibiotic tomorrow. Follow-up culture results from urgent care center.Pain is fairly controlled, Lactate is normal, no leukocytosis. It seems that the cellulitis was partially treated with Keflex without significant improvement. Overall showing some improvement, slightly receding but is still warm and red.     2.  DM II: Glucose is 114 this morning, now 148 fairly controlled. A1c 6.7. Started on Lantus 20 units twice a day. Continue sliding scale insulin, Added premeal NovoLog 2 units per 15 g of carb.    3.  Obstructive sleep apnea continue CPAP     4.  COPD: Stable.     5.  Hypertension. Continue amlodipine, hydralazine, Cozaar, metoprolol. Blood pressure is fairly controlled. Restarted spironolactone.     Total time spent in face to face contact with the patient and coordinating discharge was:  >30 Minutes.

## 2021-08-29 NOTE — PLAN OF CARE
PRIMARY DIAGNOSIS: SOFT TISSUE INFECTIONS  OUTPATIENT/OBSERVATION GOALS TO BE MET BEFORE DISCHARGE:  Vitals sign stable or return to baseline: Yes    Tolerating oral antibiotics or has home infusion set up if applicable: Yes    Pain status: Pain free.    Return to near baseline physical activity: Yes    Discharge Planner Nurse   Safe discharge environment identified: Yes  Barriers to discharge: No       Entered by: Joann Baeza 08/29/2021 3:21 AM   Denies pain. Left leg elevated with pillow.  Please review provider order for any additional goals.     Nurse to notify provider when observation goals have been met and patient is ready for discharge.

## 2021-08-30 ENCOUNTER — PATIENT OUTREACH (OUTPATIENT)
Dept: CARE COORDINATION | Facility: CLINIC | Age: 71
End: 2021-08-30

## 2021-08-30 DIAGNOSIS — Z71.89 OTHER SPECIFIED COUNSELING: ICD-10-CM

## 2021-08-30 NOTE — PROGRESS NOTES
Clinic Care Coordination Contact  [facility]/Voicemail       Clinical Data: Care Coordinator Outreach  Outreach attempted x 1.  Left message on patient's voicemail with call back information and requested return call.  Plan:  Care Coordinator will try to reach patient again in 1-2 business days.    Brynn Erwin  Care Transitions Assistant  [facility]

## 2021-08-31 NOTE — PROGRESS NOTES
Clinic Care Coordination Contact  [facility]: Post-Discharge Note  SITUATION                                                      Admission:    Admission Date: 08/26/21   Reason for Admission: Left lower extremity cellulitis, recurrent  Discharge:   Discharge Date: 08/29/21  Discharge Diagnosis: Left lower extremity cellulitis, recurrent    BACKGROUND                                                       70 year old male with history of hypertension, diabetes mellitus on insulin, obesity, CKD 3, HUBER on CPAP who presents with cellulitis of LLE and admitted on 8/26/2021    Problem List:   1. Left lower extremity cellulitis, recurrent. He was treated as an outpatient after his symptoms started on 08/19/202. He was on Keflex prior to admission now changed to Zosyn as there was no significant improvement. Elevate the leg when possible. Continue Zosyn for another day and will transition to oral antibiotic tomorrow. Follow-up culture results from urgent care center.Pain is fairly controlled, Lactate is normal, no leukocytosis. It seems that the cellulitis was partially treated with Keflex without significant improvement. Overall showing some improvement, slightly receding but is still warm and red.     2.  DM II: Glucose is 114 this morning, now 148 fairly controlled. A1c 6.7. Started on Lantus 20 units twice a day. Continue sliding scale insulin, Added premeal NovoLog 2 units per 15 g of carb.     3.  Obstructive sleep apnea continue CPAP     4.  COPD: Stable.     5.  Hypertension. Continue amlodipine, hydralazine, Cozaar, metoprolol. Blood pressure is fairly controlled. Restarted spironolactone.    ASSESSMENT           Discharge Assessment  How are you doing now that you are home?: Doing good. has C  How are your symptoms? (Red Flag symptoms escalate to triage hotline per guidelines): Improved  Do you feel your condition is stable enough to be safe at home until your provider visit?: Yes (HHC coming in.)  Does  the patient have their discharge instructions? : Yes  Does the patient have questions regarding their discharge instructions? : No  Were you started on any new medications or were there changes to any of your previous medications? : Yes (no questions)  Does the patient have all of their medications?: Yes  Do you have questions regarding any of your medications? : No  Discharge follow-up appointment scheduled within 14 calendar days? : No  Is patient agreeable to assistance with scheduling? : No (CTA encouraged patient to make appt)    Post-op ([facility]A Only)  If the patient had a surgery or procedure, do they have any questions for a nurse?: No    Post-op (Clinicians Only)  Fever: No  Chills: No  Eating & Drinking: eating and drinking without complaints/concerns  PO Intake: regular diet  Bowel Function: loose stools  Date of last BM: 08/31/21  Urinary Status: voiding without complaint/concerns        PLAN                                                      Outpatient Plan:  Follow up with primary care provider, Carlo Sorto, within 7 days for hospital follow- up.  The following labs/tests are recommended: BMP, CBC result to PCP.    No future appointments.      For any urgent concerns, please contact our 24 hour nurse triage line: [phone] ([phone])         Nona Erwin MA

## 2022-02-06 ENCOUNTER — HEALTH MAINTENANCE LETTER (OUTPATIENT)
Age: 72
End: 2022-02-06

## 2022-10-03 ENCOUNTER — HEALTH MAINTENANCE LETTER (OUTPATIENT)
Age: 72
End: 2022-10-03

## 2023-02-11 ENCOUNTER — HEALTH MAINTENANCE LETTER (OUTPATIENT)
Age: 73
End: 2023-02-11

## 2023-10-21 ENCOUNTER — HEALTH MAINTENANCE LETTER (OUTPATIENT)
Age: 73
End: 2023-10-21

## 2024-03-09 ENCOUNTER — HEALTH MAINTENANCE LETTER (OUTPATIENT)
Age: 74
End: 2024-03-09